# Patient Record
Sex: MALE | Race: BLACK OR AFRICAN AMERICAN | Employment: UNEMPLOYED | URBAN - METROPOLITAN AREA
[De-identification: names, ages, dates, MRNs, and addresses within clinical notes are randomized per-mention and may not be internally consistent; named-entity substitution may affect disease eponyms.]

---
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
## 2024-01-05 ENCOUNTER — TELEPHONE (OUTPATIENT)
Dept: BEHAVIORAL HEALTH | Age: 19
End: 2024-01-05

## 2024-01-08 ENCOUNTER — HOSPITAL ENCOUNTER (OUTPATIENT)
Dept: BEHAVIORAL HEALTH | Age: 19
End: 2024-01-08

## 2024-01-08 ENCOUNTER — TELEPHONE (OUTPATIENT)
Dept: BEHAVIORAL HEALTH | Age: 19
End: 2024-01-08

## 2024-01-08 SDOH — ECONOMIC STABILITY: GENERAL

## 2024-01-08 SDOH — SOCIAL STABILITY: SOCIAL INSECURITY: HOW OFTEN DOES ANYONE, INCLUDING FAMILY AND FRIENDS, SCREAM OR CURSE AT YOU?: NEVER

## 2024-01-08 SDOH — SOCIAL STABILITY: SOCIAL INSECURITY: HOW OFTEN DOES ANYONE, INCLUDING FAMILY AND FRIENDS, THREATEN YOU WITH HARM?: NEVER

## 2024-01-08 SDOH — ECONOMIC STABILITY: FOOD INSECURITY: WITHIN THE PAST 12 MONTHS, THE FOOD YOU BOUGHT JUST DIDN'T LAST AND YOU DIDN'T HAVE MONEY TO GET MORE.: NEVER TRUE

## 2024-01-08 SDOH — SOCIAL STABILITY: SOCIAL INSECURITY: HOW OFTEN DOES ANYONE, INCLUDING FAMILY AND FRIENDS, PHYSICALLY HURT YOU?: NEVER

## 2024-01-08 SDOH — ECONOMIC STABILITY: HOUSING INSECURITY: WHAT IS YOUR LIVING SITUATION TODAY?: I HAVE A STEADY PLACE TO LIVE

## 2024-01-08 SDOH — ECONOMIC STABILITY: HOUSING INSECURITY: DO YOU HAVE PROBLEMS WITH ANY OF THE FOLLOWING?: NONE OF THE ABOVE

## 2024-01-08 SDOH — SOCIAL STABILITY: SOCIAL INSECURITY: HOW OFTEN DOES ANYONE, INCLUDING FAMILY AND FRIENDS, INSULT OR TALK DOWN TO YOU?: NEVER

## 2024-01-08 SDOH — ECONOMIC STABILITY: TRANSPORTATION INSECURITY
IN THE PAST 12 MONTHS, HAS LACK OF RELIABLE TRANSPORTATION KEPT YOU FROM MEDICAL APPOINTMENTS, MEETINGS, WORK OR FROM GETTING THINGS NEEDED FOR DAILY LIVING?: NO

## 2024-01-08 SDOH — SOCIAL STABILITY: SOCIAL NETWORK
HOW OFTEN DO YOU SEE OR TALK TO PEOPLE THAT YOU CARE ABOUT AND FEEL CLOSE TO? (FOR EXAMPLE: TALKING TO FRIENDS ON THE PHONE, VISITING FRIENDS OR FAMILY, GOING TO CHURCH OR CLUB MEETINGS): 5 OR MORE TIMES A WEEK

## 2024-01-08 ASSESSMENT — COGNITIVE AND FUNCTIONAL STATUS - GENERAL
COGNITIVE_CONTENT: APPROPRIATE TO CIRCUMSTANCE
MEMORY: INTACT
ORIENTATION: ORIENTED TO PERSON;ORIENTED TO PLACE;ORIENTED TO TIME
SPEECH: CLEAR/UNDERSTANDABLE
MOTOR_BEHAVIOR-RETARDATION_CALCULATED: UNABLE TO ASSESS
ATTENTION_CALCULATED: MAINTAINS ATTENTION
INSIGHT: POOR
COGNITIVE_PROCESS: ORGANIZED/COHERENT
PERCEPTUAL_MISINTERPRETATIONS_HALLUCINATIONS: CLEAR REALITY BASED PERCEPTIONS
JUDGEMENT: POOR
RELIABILITY: APPEARS TO BE TRUTHFUL/RELIABLE
BEHAVIOR: UNABLE TO ASSESS
AFFECT: UNABLE TO ASSESS
LEVEL_OF_CONSCIOUSNESS_CALCULATED: ALERT
MOOD: DEPRESSED

## 2024-01-08 ASSESSMENT — LIFESTYLE VARIABLES
AUDIT-C TOTAL SCORE: 0
ALCOHOL_USE: DENIES
HOW MANY STANDARD DRINKS CONTAINING ALCOHOL DO YOU HAVE ON A TYPICAL DAY: 0,1 OR 2
HOW OFTEN DO YOU HAVE A DRINK CONTAINING ALCOHOL: NEVER
ALCOHOL_USE_STATUS: NO OR LOW RISK WITH VALIDATED TOOL
HOW OFTEN DO YOU HAVE 6 OR MORE DRINKS ON ONE OCCASION: NEVER

## 2024-01-09 ENCOUNTER — HOSPITAL ENCOUNTER (OUTPATIENT)
Age: 19
End: 2024-01-09
Attending: PSYCHIATRY & NEUROLOGY | Admitting: PSYCHIATRY & NEUROLOGY

## 2024-01-09 DIAGNOSIS — F12.20 MARIJUANA DEPENDENCE (CMD): Primary | ICD-10-CM

## 2024-01-09 LAB
AMPHETAMINES UR QL SCN: NEGATIVE
AMPHETAMINES UR QL SCN: NEGATIVE
B3G UR QL SCN: NEGATIVE
BARBITURATES UR QL SCN: NEGATIVE
BENZODIAZ UR QL SCN: NEGATIVE
BREATH ALCOHOL TESTING: 0
COCAINE UR QL SCN: NEGATIVE
EXP. DATE-APH, POC: NORMAL
INTERNAL PROCEDURAL CONTROLS ACCEPTABLE: YES
LOT # - APH, POC: NORMAL
MDMA UR QL SCN: NEGATIVE
METHADONE UR QL SCN: NEGATIVE
OPIATES UR QL SCN: NEGATIVE
OXYCODONE/OXYCONTIN-APH, POC: NEGATIVE
PCP UR QL SCN: NEGATIVE
SPECIMEN TEMPERATURE-APH, POC: 92
THC UR QL SCN: POSITIVE
TRICYCLICS UR QL SCN: NEGATIVE

## 2024-01-09 PROCEDURE — 82075 ASSAY OF BREATH ETHANOL: CPT | Performed by: PSYCHIATRY & NEUROLOGY

## 2024-01-09 PROCEDURE — 10004771 HB ROOM CHARGE RESIDENTIAL

## 2024-01-09 PROCEDURE — 80305 DRUG TEST PRSMV DIR OPT OBS: CPT | Performed by: PSYCHIATRY & NEUROLOGY

## 2024-01-09 RX ORDER — ACETAMINOPHEN 325 MG/1
650 TABLET ORAL EVERY 4 HOURS PRN
Status: DISCONTINUED | OUTPATIENT
Start: 2024-01-09 | End: 2024-01-22 | Stop reason: HOSPADM

## 2024-01-09 RX ORDER — LOPERAMIDE HYDROCHLORIDE 2 MG/1
2 CAPSULE ORAL PRN
Status: DISCONTINUED | OUTPATIENT
Start: 2024-01-09 | End: 2024-01-22 | Stop reason: HOSPADM

## 2024-01-09 RX ORDER — HYDROXYZINE HYDROCHLORIDE 25 MG/1
25 TABLET, FILM COATED ORAL EVERY 6 HOURS PRN
Qty: 30 TABLET | Refills: 0 | Status: SHIPPED | OUTPATIENT
Start: 2024-01-09

## 2024-01-09 RX ORDER — GABAPENTIN 300 MG/1
300 CAPSULE ORAL 3 TIMES DAILY
Status: DISCONTINUED | OUTPATIENT
Start: 2024-01-09 | End: 2024-01-22 | Stop reason: HOSPADM

## 2024-01-09 RX ORDER — ACETAMINOPHEN 500 MG
1000 TABLET ORAL EVERY 4 HOURS PRN
Status: DISCONTINUED | OUTPATIENT
Start: 2024-01-09 | End: 2024-01-22 | Stop reason: HOSPADM

## 2024-01-09 RX ORDER — FLUTICASONE PROPIONATE 50 MCG
1 SPRAY, SUSPENSION (ML) NASAL DAILY
Status: DISCONTINUED | OUTPATIENT
Start: 2024-01-09 | End: 2024-01-22 | Stop reason: HOSPADM

## 2024-01-09 RX ORDER — HYDROXYZINE HYDROCHLORIDE 25 MG/1
25 TABLET, FILM COATED ORAL EVERY 6 HOURS PRN
Status: DISCONTINUED | OUTPATIENT
Start: 2024-01-09 | End: 2024-01-22 | Stop reason: HOSPADM

## 2024-01-09 RX ORDER — TRAZODONE HYDROCHLORIDE 50 MG/1
50 TABLET ORAL NIGHTLY PRN
Qty: 28 TABLET | Refills: 0 | Status: SHIPPED | OUTPATIENT
Start: 2024-01-09

## 2024-01-09 RX ORDER — TRAZODONE HYDROCHLORIDE 50 MG/1
50 TABLET ORAL NIGHTLY PRN
Status: DISCONTINUED | OUTPATIENT
Start: 2024-01-09 | End: 2024-01-22 | Stop reason: HOSPADM

## 2024-01-09 RX ORDER — ACETAMINOPHEN 500 MG
500 TABLET ORAL EVERY 4 HOURS PRN
Status: DISCONTINUED | OUTPATIENT
Start: 2024-01-09 | End: 2024-01-22 | Stop reason: HOSPADM

## 2024-01-09 RX ORDER — GABAPENTIN 300 MG/1
300 CAPSULE ORAL 3 TIMES DAILY
Qty: 84 CAPSULE | Refills: 0 | Status: SHIPPED | OUTPATIENT
Start: 2024-01-09

## 2024-01-09 RX ORDER — FLUTICASONE PROPIONATE 50 MCG
1 SPRAY, SUSPENSION (ML) NASAL DAILY
COMMUNITY

## 2024-01-09 RX ORDER — MAGNESIUM HYDROXIDE/ALUMINUM HYDROXICE/SIMETHICONE 120; 1200; 1200 MG/30ML; MG/30ML; MG/30ML
30 SUSPENSION ORAL EVERY 4 HOURS PRN
Status: DISCONTINUED | OUTPATIENT
Start: 2024-01-09 | End: 2024-01-22 | Stop reason: HOSPADM

## 2024-01-09 ASSESSMENT — PAIN SCALES - GENERAL: PAINLEVEL_OUTOF10: 0

## 2024-01-10 ENCOUNTER — TELEPHONE (OUTPATIENT)
Dept: BEHAVIORAL HEALTH | Age: 19
End: 2024-01-10

## 2024-01-10 PROCEDURE — 10004771 HB ROOM CHARGE RESIDENTIAL

## 2024-01-10 PROCEDURE — 10004772 HB RESIDENTIAL PROGRAMMING

## 2024-01-10 PROCEDURE — 90792 PSYCH DIAG EVAL W/MED SRVCS: CPT | Performed by: PSYCHIATRY & NEUROLOGY

## 2024-01-10 SDOH — ECONOMIC STABILITY: HOUSING INSECURITY: HAVE YOU HAD DIFFICULTIES WITH HOUSING OR FINDING SOMEWHERE STABLE TO LIVE?: 0 - NOT AT ALL

## 2024-01-10 ASSESSMENT — LIFESTYLE VARIABLES
HOW OFTEN HAS YOUR ALCOHOL OR DRUG USE LED TO PROBLEMS WITH FRIENDS OR FAMILY MEMBERS?: 2 - ONCE OR TWICE A WEEK
HANDLING NEGATIVE FEELINGS AND REACTING TO LIFE'S UPS AND DOWNS WITHOUT USING ALCOHOL OR DRUGS: 4
HOW OFTEN HAS YOUR ALCOHOL OR DRUG USE CAUSED PROBLEMS WITH YOUR WORK OR OTHER ACTIVITIES IN ANY OF THE FOLLOWING: SOCIAL, RECREATIONAL, LOOKING AFTER CHILDREN OR OTHER FAMILY MEMBERS, STUDY OR OTHER PERSONAL ACTIVITIES?: 4 - DAILY OR ALMOST DAILY
DAYS ALCOHOL CONSUMED IN THE LAST FOUR WEEKS: 0
ENJOYING LIFE WITHOUT USING ALCOHOL OR DRUGS: 6
AVOIDING PEOPLE, PLACES, SITUATIONS OR THING THAT TRIGGER MY  USE OF ALCOHOL OR DRUGS: 9
HOW MANY CIGARETTES HAVE YOU SMOKED PER DAY ON AVERAGE?: 0
HOW OFTEN HAVE YOU BEEN INVOLVED IN ANY CRIMINAL OR ILLEGAL ACTIVITIES SUCH AS DRIVING A MOTOR VEHICLE UNDER THE INFLUENCE OF ALCOHOL OR DRUGS, ASSAULT, SHOPLIFTING, SUPPLYING AN ILLICIT SUBSTANCE TO ANOTHER PERSON (DO NOT INCLUDE USING ILLEGAL DRUGS).: 1 - LESS THAN WEEKLY
FREE FROM BEING TROUBLED OR BOTHERED BY STRONG URGES TO USE ALCOHOL OR DRUGS: 3

## 2024-01-10 ASSESSMENT — PATIENT HEALTH QUESTIONNAIRE - PHQ9
SUM OF ALL RESPONSES TO PHQ9 QUESTIONS 1 AND 2: 2
IS PATIENT ABLE TO COMPLETE PHQ2 OR PHQ9: YES
SUM OF ALL RESPONSES TO PHQ QUESTIONS 1-9: 8
CLINICAL INTERPRETATION OF PHQ9 SCORE: MILD DEPRESSION
CLINICAL INTERPRETATION OF PHQ2 SCORE: NO FURTHER SCREENING NEEDED

## 2024-01-10 ASSESSMENT — PAIN SCALES - GENERAL: PAINLEVEL_OUTOF10: 0

## 2024-01-11 PROCEDURE — 99349 HOME/RES VST EST MOD MDM 40: CPT | Performed by: PSYCHIATRY & NEUROLOGY

## 2024-01-11 PROCEDURE — 10004771 HB ROOM CHARGE RESIDENTIAL

## 2024-01-11 PROCEDURE — 10004772 HB RESIDENTIAL PROGRAMMING

## 2024-01-11 ASSESSMENT — PAIN SCALES - GENERAL
PAINLEVEL_OUTOF10: 0
PAINLEVEL_OUTOF10: 0

## 2024-01-12 PROCEDURE — 80305 DRUG TEST PRSMV DIR OPT OBS: CPT | Performed by: PSYCHIATRY & NEUROLOGY

## 2024-01-12 PROCEDURE — 10004771 HB ROOM CHARGE RESIDENTIAL

## 2024-01-12 ASSESSMENT — PAIN SCALES - GENERAL: PAINLEVEL_OUTOF10: 0

## 2024-01-13 VITALS
RESPIRATION RATE: 16 BRPM | BODY MASS INDEX: 20.32 KG/M2 | TEMPERATURE: 97.2 F | OXYGEN SATURATION: 98 % | WEIGHT: 150 LBS | SYSTOLIC BLOOD PRESSURE: 132 MMHG | HEIGHT: 72 IN | DIASTOLIC BLOOD PRESSURE: 78 MMHG | HEART RATE: 89 BPM

## 2024-01-13 LAB
AMPHETAMINES UR QL SCN: NEGATIVE
AMPHETAMINES UR QL SCN: NEGATIVE
B3G UR QL SCN: NEGATIVE
BARBITURATES UR QL SCN: NEGATIVE
BENZODIAZ UR QL SCN: NEGATIVE
COCAINE UR QL SCN: NEGATIVE
EXP. DATE-APH, POC: NORMAL
INTERNAL PROCEDURAL CONTROLS ACCEPTABLE: YES
LOT # - APH, POC: NORMAL
MDMA UR QL SCN: NEGATIVE
METHADONE UR QL SCN: NEGATIVE
OPIATES UR QL SCN: NEGATIVE
OXYCODONE/OXYCONTIN-APH, POC: NEGATIVE
PCP UR QL SCN: NEGATIVE
SPECIMEN TEMPERATURE-APH, POC: 92
THC UR QL SCN: POSITIVE
TRICYCLICS UR QL SCN: NEGATIVE

## 2024-01-13 PROCEDURE — 10004772 HB RESIDENTIAL PROGRAMMING

## 2024-01-13 PROCEDURE — 10004771 HB ROOM CHARGE RESIDENTIAL

## 2024-01-13 ASSESSMENT — PAIN SCALES - GENERAL: PAINLEVEL_OUTOF10: 0

## 2024-01-14 PROCEDURE — 10004772 HB RESIDENTIAL PROGRAMMING

## 2024-01-14 PROCEDURE — 10004771 HB ROOM CHARGE RESIDENTIAL

## 2024-01-14 ASSESSMENT — PAIN SCALES - GENERAL: PAINLEVEL_OUTOF10: 0

## 2024-01-15 PROCEDURE — 10004771 HB ROOM CHARGE RESIDENTIAL

## 2024-01-15 PROCEDURE — 10004772 HB RESIDENTIAL PROGRAMMING

## 2024-01-15 PROCEDURE — 90833 PSYTX W PT W E/M 30 MIN: CPT | Performed by: PSYCHIATRY & NEUROLOGY

## 2024-01-15 PROCEDURE — 99349 HOME/RES VST EST MOD MDM 40: CPT | Performed by: PSYCHIATRY & NEUROLOGY

## 2024-01-15 ASSESSMENT — PAIN SCALES - GENERAL
PAINLEVEL_OUTOF10: 0
PAINLEVEL_OUTOF10: 0

## 2024-01-16 LAB
AMPHETAMINES UR QL SCN: NEGATIVE
AMPHETAMINES UR QL SCN: NEGATIVE
B3G UR QL SCN: NEGATIVE
BARBITURATES UR QL SCN: NEGATIVE
BENZODIAZ UR QL SCN: NEGATIVE
COCAINE UR QL SCN: NEGATIVE
EXP. DATE-APH, POC: NORMAL
INTERNAL PROCEDURAL CONTROLS ACCEPTABLE: YES
LOT # - APH, POC: NORMAL
MDMA UR QL SCN: NEGATIVE
METHADONE UR QL SCN: NEGATIVE
OPIATES UR QL SCN: NEGATIVE
OXYCODONE/OXYCONTIN-APH, POC: NEGATIVE
PCP UR QL SCN: NEGATIVE
SPECIMEN TEMPERATURE-APH, POC: 92
THC UR QL SCN: POSITIVE
TRICYCLICS UR QL SCN: NORMAL

## 2024-01-16 PROCEDURE — 10004771 HB ROOM CHARGE RESIDENTIAL

## 2024-01-16 PROCEDURE — 99349 HOME/RES VST EST MOD MDM 40: CPT | Performed by: PSYCHIATRY & NEUROLOGY

## 2024-01-16 PROCEDURE — 80305 DRUG TEST PRSMV DIR OPT OBS: CPT | Performed by: PSYCHIATRY & NEUROLOGY

## 2024-01-16 PROCEDURE — 10004772 HB RESIDENTIAL PROGRAMMING

## 2024-01-16 ASSESSMENT — PAIN SCALES - GENERAL
PAINLEVEL_OUTOF10: 0
PAINLEVEL_OUTOF10: 0

## 2024-01-17 LAB
AMPHETAMINES UR QL SCN: NEGATIVE
B3G UR QL SCN: NEGATIVE
B3G UR QL SCN: NEGATIVE
BARBITURATES UR QL SCN: NEGATIVE
BARBITURATES UR QL SCN: NEGATIVE
BENZODIAZ UR QL SCN: NEGATIVE
BENZODIAZ UR QL SCN: NEGATIVE
BREATH ALCOHOL TESTING: 0
COCAINE UR QL SCN: NEGATIVE
COCAINE UR QL SCN: NEGATIVE
EXP. DATE-APH, POC: ABNORMAL
EXP. DATE-APH, POC: NORMAL
INTERNAL PROCEDURAL CONTROLS ACCEPTABLE: NO
INTERNAL PROCEDURAL CONTROLS ACCEPTABLE: YES
LOT # - APH, POC: ABNORMAL
LOT # - APH, POC: NORMAL
MDMA UR QL SCN: NEGATIVE
MDMA UR QL SCN: NEGATIVE
METHADONE UR QL SCN: NEGATIVE
METHADONE UR QL SCN: NEGATIVE
OPIATES UR QL SCN: NEGATIVE
OPIATES UR QL SCN: NEGATIVE
OXYCODONE/OXYCONTIN-APH, POC: NEGATIVE
OXYCODONE/OXYCONTIN-APH, POC: NEGATIVE
PCP UR QL SCN: NEGATIVE
PCP UR QL SCN: NEGATIVE
SPECIMEN TEMPERATURE-APH, POC: 93
SPECIMEN TEMPERATURE-APH, POC: 94
THC UR QL SCN: NEGATIVE
THC UR QL SCN: NEGATIVE
TRICYCLICS UR QL SCN: ABNORMAL
TRICYCLICS UR QL SCN: NORMAL

## 2024-01-17 PROCEDURE — 10004772 HB RESIDENTIAL PROGRAMMING

## 2024-01-17 PROCEDURE — 10004771 HB ROOM CHARGE RESIDENTIAL

## 2024-01-17 PROCEDURE — 82075 ASSAY OF BREATH ETHANOL: CPT | Performed by: PSYCHIATRY & NEUROLOGY

## 2024-01-17 PROCEDURE — 80305 DRUG TEST PRSMV DIR OPT OBS: CPT | Performed by: PSYCHIATRY & NEUROLOGY

## 2024-01-17 ASSESSMENT — PAIN SCALES - GENERAL: PAINLEVEL_OUTOF10: 0

## 2024-01-18 PROCEDURE — 99349 HOME/RES VST EST MOD MDM 40: CPT | Performed by: PSYCHIATRY & NEUROLOGY

## 2024-01-18 PROCEDURE — 10004772 HB RESIDENTIAL PROGRAMMING

## 2024-01-18 PROCEDURE — 10004771 HB ROOM CHARGE RESIDENTIAL

## 2024-01-18 ASSESSMENT — PAIN SCALES - GENERAL
PAINLEVEL_OUTOF10: 0
PAINLEVEL_OUTOF10: 0

## 2024-01-19 PROCEDURE — 10004772 HB RESIDENTIAL PROGRAMMING

## 2024-01-19 PROCEDURE — 90833 PSYTX W PT W E/M 30 MIN: CPT | Performed by: PSYCHIATRY & NEUROLOGY

## 2024-01-19 PROCEDURE — 99349 HOME/RES VST EST MOD MDM 40: CPT | Performed by: PSYCHIATRY & NEUROLOGY

## 2024-01-19 PROCEDURE — 10004771 HB ROOM CHARGE RESIDENTIAL

## 2024-01-19 ASSESSMENT — PAIN SCALES - GENERAL: PAINLEVEL_OUTOF10: 0

## 2024-01-20 LAB
AMPHETAMINES UR QL SCN: NEGATIVE
AMPHETAMINES UR QL SCN: NEGATIVE
B3G UR QL SCN: NEGATIVE
BARBITURATES UR QL SCN: NEGATIVE
BENZODIAZ UR QL SCN: NEGATIVE
COCAINE UR QL SCN: NEGATIVE
EXP. DATE-APH, POC: NORMAL
INTERNAL PROCEDURAL CONTROLS ACCEPTABLE: YES
LOT # - APH, POC: NORMAL
MDMA UR QL SCN: NEGATIVE
METHADONE UR QL SCN: NEGATIVE
OPIATES UR QL SCN: NEGATIVE
OXYCODONE/OXYCONTIN-APH, POC: NEGATIVE
PCP UR QL SCN: NEGATIVE
SPECIMEN TEMPERATURE-APH, POC: 92
THC UR QL SCN: NEGATIVE
TRICYCLICS UR QL SCN: NEGATIVE

## 2024-01-20 PROCEDURE — 10004771 HB ROOM CHARGE RESIDENTIAL

## 2024-01-20 PROCEDURE — 80305 DRUG TEST PRSMV DIR OPT OBS: CPT | Performed by: PSYCHIATRY & NEUROLOGY

## 2024-01-20 ASSESSMENT — PAIN SCALES - GENERAL: PAINLEVEL_OUTOF10: 0

## 2024-01-21 LAB
AMPHETAMINES UR QL SCN: NEGATIVE
AMPHETAMINES UR QL SCN: NEGATIVE
B3G UR QL SCN: NEGATIVE
BARBITURATES UR QL SCN: NEGATIVE
BENZODIAZ UR QL SCN: NEGATIVE
BREATH ALCOHOL TESTING: 0
COCAINE UR QL SCN: NEGATIVE
EXP. DATE-APH, POC: NORMAL
INTERNAL PROCEDURAL CONTROLS ACCEPTABLE: YES
LOT # - APH, POC: NORMAL
MDMA UR QL SCN: NEGATIVE
METHADONE UR QL SCN: NEGATIVE
OPIATES UR QL SCN: NEGATIVE
OXYCODONE/OXYCONTIN-APH, POC: NEGATIVE
PCP UR QL SCN: NEGATIVE
SPECIMEN TEMPERATURE-APH, POC: 92
THC UR QL SCN: NEGATIVE
TRICYCLICS UR QL SCN: NEGATIVE

## 2024-01-21 PROCEDURE — 82075 ASSAY OF BREATH ETHANOL: CPT | Performed by: PSYCHIATRY & NEUROLOGY

## 2024-01-21 PROCEDURE — 80305 DRUG TEST PRSMV DIR OPT OBS: CPT | Performed by: PSYCHIATRY & NEUROLOGY

## 2024-01-21 PROCEDURE — 10004772 HB RESIDENTIAL PROGRAMMING

## 2024-01-21 PROCEDURE — 10004771 HB ROOM CHARGE RESIDENTIAL

## 2024-01-22 VITALS
SYSTOLIC BLOOD PRESSURE: 159 MMHG | HEIGHT: 72 IN | WEIGHT: 150 LBS | RESPIRATION RATE: 14 BRPM | TEMPERATURE: 97.9 F | HEART RATE: 88 BPM | DIASTOLIC BLOOD PRESSURE: 69 MMHG | BODY MASS INDEX: 20.32 KG/M2 | OXYGEN SATURATION: 100 %